# Patient Record
Sex: FEMALE | Race: AMERICAN INDIAN OR ALASKA NATIVE
[De-identification: names, ages, dates, MRNs, and addresses within clinical notes are randomized per-mention and may not be internally consistent; named-entity substitution may affect disease eponyms.]

---

## 2017-07-14 ENCOUNTER — HOSPITAL ENCOUNTER (EMERGENCY)
Dept: HOSPITAL 41 - JD.ED | Age: 27
Discharge: HOME | End: 2017-07-14
Payer: COMMERCIAL

## 2017-07-14 VITALS — DIASTOLIC BLOOD PRESSURE: 92 MMHG | SYSTOLIC BLOOD PRESSURE: 122 MMHG

## 2017-07-14 DIAGNOSIS — Z79.899: ICD-10-CM

## 2017-07-14 DIAGNOSIS — Z3A.01: ICD-10-CM

## 2017-07-14 DIAGNOSIS — O21.9: Primary | ICD-10-CM

## 2017-07-14 PROCEDURE — 80053 COMPREHEN METABOLIC PANEL: CPT

## 2017-07-14 PROCEDURE — 84702 CHORIONIC GONADOTROPIN TEST: CPT

## 2017-07-14 PROCEDURE — 36415 COLL VENOUS BLD VENIPUNCTURE: CPT

## 2017-07-14 PROCEDURE — 99285 EMERGENCY DEPT VISIT HI MDM: CPT

## 2017-07-14 PROCEDURE — 81001 URINALYSIS AUTO W/SCOPE: CPT

## 2017-07-14 PROCEDURE — 86140 C-REACTIVE PROTEIN: CPT

## 2017-07-14 PROCEDURE — 76817 TRANSVAGINAL US OBSTETRIC: CPT

## 2017-07-14 PROCEDURE — 85025 COMPLETE CBC W/AUTO DIFF WBC: CPT

## 2017-07-14 PROCEDURE — 96374 THER/PROPH/DIAG INJ IV PUSH: CPT

## 2017-07-14 PROCEDURE — 93005 ELECTROCARDIOGRAM TRACING: CPT

## 2017-07-14 PROCEDURE — 96375 TX/PRO/DX INJ NEW DRUG ADDON: CPT

## 2017-07-14 PROCEDURE — 96361 HYDRATE IV INFUSION ADD-ON: CPT

## 2017-07-14 NOTE — EDM.PDOC
<Chikis Shelton - Last Filed: 17 19:55>





ED HPI GENERAL MEDICAL PROBLEM





- General


Chief Complaint: Gastrointestinal Problem


Stated Complaint: NAUSEA AND VOMITING X 10 DAYS


Time Seen by Provider: 17 16:00


Source of Information: Reports: Patient


History Limitations: Reports: No Limitations





- History of Present Illness


INITIAL COMMENTS - FREE TEXT/NARRATIVE: 





Nati is a 27 year old female who presents today with complaints of nausea and 

vomiting X 10 days. Her symptoms have been gradually getting worse. She has not 

been able to keep anything down for the past 6 days.  When her symptoms began, 

she developed a cough with productive sputum. These symptoms have been 

improving gradually, but the nausea and vomiting is continuous. She has 

dizziness with position changes.  Associated symptoms include chills and 

shortness of breath. Denies fever, night sweats, diarrhea. She has not had any 

recent travel or close contacts who have been sick. She reports her last 

menstrual period was "maybe sometime in " but unsure of the exact date. 





- Related Data


 Allergies











Allergy/AdvReac Type Severity Reaction Status Date / Time


 


No Known Allergies Allergy   Verified 17 15:32











Home Meds: 


 Home Meds





Acetaminophen/HYDROcodone [Norco 325-5 MG] 1 tab PO Q8H 17 [History]


Cyclobenzaprine [Flexeril] 5 mg PO TID 17 [History]


Doxylamine/Pyridoxine HCl [Diclegis Dr 10-10 mg Tablet] 1 each PO ASDIRECTED #

30 tablet. 17 [Rx]


Ferrous Sulfate [Iron] 325 mg PO DAILY 17 [History]


Gabapentin [Neurontin] 600 mg PO TID 17 [History]











ED ROS GENERAL





- Review of Systems


Review Of Systems: See Below


Constitutional: Reports: Chills, Weakness.  Denies: Fever


HEENT: Reports: No Symptoms


Respiratory: Reports: Shortness of Breath


Cardiovascular: Denies: Lightheadedness


GI/Abdominal: Reports: Abdominal Pain, Mucous in Stool.  Denies: Bloody Stool, 

Diarrhea, Hematemesis, Hematochezia


: Denies: Dysuria, Flank Pain, Frequency, Urgency


Musculoskeletal: Denies: Neck Pain


Skin: Reports: No Symptoms


Neurological: Reports: Dizziness (dizziness with position changes )





ED EXAM, GI/ABD





- Physical Exam


Exam Limited By: No Limitations


General Appearance: Alert


Ears: Normal TMs


Throat/Mouth: Normal Oropharynx


Head: Normocephalic


Neck: Supple


Respiratory/Chest: Lungs Clear, Normal Breath Sounds


Cardiovascular: Regular Rate, Rhythm, No Edema


GI/Abdominal: Tenderness (tenderness upon palpation to mid-upper gastric area )


Neurological: Alert, Oriented, CN II-XII Intact


Psychiatric: Normal Affect


Skin Exam: Intact, Diaphoretic


Lymphatic: No Adenopathy





Course





- Vital Signs


Last Recorded V/S: 


 Last Vital Signs











Temp  36.6 C   17 15:30


 


Pulse  74   17 15:30


 


Resp  19   17 15:30


 


BP  122/92 H  17 15:30


 


Pulse Ox  99   17 15:30














- Orders/Labs/Meds


Orders: 


 Active Orders 24 hr











 Category Date Time Status


 


 EKG Documentation Completion [RC] STAT Care  17 16:13 Active


 


 Orthostatic Vital Signs [RC] ASDIRECTED Care  17 16:37 Active


 


 Peripheral IV Care [RC] .AS DIRECTED Care  17 16:14 Active


 


 OB Transvaginal [US] Stat Exams  17 17:50 Taken


 


 Peripheral IV Insertion Adult [OM.PC] Routine Oth  17 16:13 Ordered











Labs: 


 Laboratory Tests











  17 Range/Units





  15:54 15:54 15:54 


 


WBC  9.90    (3.98-10.04)  K/mm3


 


RBC  4.86    (3.98-5.22)  M/mm3


 


Hgb  13.1    (11.2-15.7)  gm/L


 


Hct  38.9    (34.1-44.9)  %


 


MCV  80.0    (79.4-94.8)  fl


 


MCH  27.0    (25.6-32.2)  pg


 


MCHC  33.7    (32.2-35.5)  g/dl


 


RDW Std Deviation  40.4    (36.4-46.3)  fL


 


Plt Count  365    (182-369)  K/mm3


 


MPV  9.9    (9.4-12.3)  fl


 


Neutrophils % (Manual)  92 H    (40-60)  %


 


Band Neutrophils %  0    (0-10)  %


 


Lymphocytes % (Manual)  5 L    (20-40)  %


 


Atypical Lymphs %  0    %


 


Monocytes % (Manual)  3    (2-10)  %


 


Eosinophils % (Manual)  0 L    (0.7-5.8)  %


 


Basophils % (Manual)  0 L    (0.1-1.2)  


 


Platelet Estimate  Adequate    


 


RBC Morph Comment  Normal    


 


Sodium   137   (136-145)  mEq/L


 


Potassium   3.8   (3.5-5.1)  mEq/L


 


Chloride   102   ()  mEq/L


 


Carbon Dioxide   22   (21-32)  mEq/L


 


Anion Gap   16.8 H   (5-15)  


 


BUN   8   (7-18)  mg/dL


 


Creatinine   0.8   (0.55-1.02)  mg/dL


 


Est Cr Clr Drug Dosing   95.89   mL/min


 


Estimated GFR (MDRD)   > 60   (>60)  mL/min


 


BUN/Creatinine Ratio   10.0 L   (14-18)  


 


Glucose   99   ()  mg/dL


 


Calcium   9.6   (8.5-10.1)  mg/dL


 


Total Bilirubin   0.6   (0.2-1.0)  mg/dL


 


AST   15   (15-37)  U/L


 


ALT   19   (14-59)  U/L


 


Alkaline Phosphatase   63   ()  U/L


 


C-Reactive Protein    4.3 H*  (<1.0)  mg/dL


 


Total Protein   9.2 H   (6.4-8.2)  g/dl


 


Albumin   3.7   (3.4-5.0)  g/dl


 


Globulin   5.5   gm/dL


 


Albumin/Globulin Ratio   0.7 L   (1-2)  


 


HCG, Quant    520665.0  mIU/mL


 


Urine Color     (Yellow)  


 


Urine Appearance     (Clear)  


 


Urine pH     (5.0-8.0)  


 


Ur Specific Gravity     (1.005-1.030)  


 


Urine Protein     (Negative)  


 


Urine Glucose (UA)     (Negative)  


 


Urine Ketones     (Negative)  


 


Urine Occult Blood     (Negative)  


 


Urine Nitrite     (Negative)  


 


Urine Bilirubin     (Negative)  


 


Urine Urobilinogen     (0.2-1.0)  


 


Ur Leukocyte Esterase     (Negative)  


 


Urine RBC     (0-5)  /hpf


 


Urine WBC     (0-5)  /hpf


 


Ur Epithelial Cells     (0-5)  /hpf


 


Urine Bacteria     (FEW)  /hpf


 


Urine Mucus     (FEW)  /hpf














  / Range/Units





  17:32 


 


WBC   (3.98-10.04)  K/mm3


 


RBC   (3.98-5.22)  M/mm3


 


Hgb   (11.2-15.7)  gm/L


 


Hct   (34.1-44.9)  %


 


MCV   (79.4-94.8)  fl


 


MCH   (25.6-32.2)  pg


 


MCHC   (32.2-35.5)  g/dl


 


RDW Std Deviation   (36.4-46.3)  fL


 


Plt Count   (182-369)  K/mm3


 


MPV   (9.4-12.3)  fl


 


Neutrophils % (Manual)   (40-60)  %


 


Band Neutrophils %   (0-10)  %


 


Lymphocytes % (Manual)   (20-40)  %


 


Atypical Lymphs %   %


 


Monocytes % (Manual)   (2-10)  %


 


Eosinophils % (Manual)   (0.7-5.8)  %


 


Basophils % (Manual)   (0.1-1.2)  


 


Platelet Estimate   


 


RBC Morph Comment   


 


Sodium   (136-145)  mEq/L


 


Potassium   (3.5-5.1)  mEq/L


 


Chloride   ()  mEq/L


 


Carbon Dioxide   (21-32)  mEq/L


 


Anion Gap   (5-15)  


 


BUN   (7-18)  mg/dL


 


Creatinine   (0.55-1.02)  mg/dL


 


Est Cr Clr Drug Dosing   mL/min


 


Estimated GFR (MDRD)   (>60)  mL/min


 


BUN/Creatinine Ratio   (14-18)  


 


Glucose   ()  mg/dL


 


Calcium   (8.5-10.1)  mg/dL


 


Total Bilirubin   (0.2-1.0)  mg/dL


 


AST   (15-37)  U/L


 


ALT   (14-59)  U/L


 


Alkaline Phosphatase   ()  U/L


 


C-Reactive Protein   (<1.0)  mg/dL


 


Total Protein   (6.4-8.2)  g/dl


 


Albumin   (3.4-5.0)  g/dl


 


Globulin   gm/dL


 


Albumin/Globulin Ratio   (1-2)  


 


HCG, Quant   mIU/mL


 


Urine Color  Yellow  (Yellow)  


 


Urine Appearance  Slt cloudy H  (Clear)  


 


Urine pH  7.0  (5.0-8.0)  


 


Ur Specific Gravity  1.020  (1.005-1.030)  


 


Urine Protein  1+ H  (Negative)  


 


Urine Glucose (UA)  Negative  (Negative)  


 


Urine Ketones  3+ H  (Negative)  


 


Urine Occult Blood  Negative  (Negative)  


 


Urine Nitrite  Negative  (Negative)  


 


Urine Bilirubin  1+ H  (Negative)  


 


Urine Urobilinogen  2.0 H  (0.2-1.0)  


 


Ur Leukocyte Esterase  Negative  (Negative)  


 


Urine RBC  0-5  (0-5)  /hpf


 


Urine WBC  0-5  (0-5)  /hpf


 


Ur Epithelial Cells  5-10 H  (0-5)  /hpf


 


Urine Bacteria  Moderate H  (FEW)  /hpf


 


Urine Mucus  Moderate H  (FEW)  /hpf











Meds: 


Medications














Discontinued Medications














Generic Name Dose Route Start Last Admin





  Trade Name Freq  PRN Reason Stop Dose Admin


 


Diphenhydramine HCl  25 mg  17 20:47  17 20:52





  Benadryl  IVPUSH  17 20:48  25 mg





  ONETIME ONE   Administration


 


Sodium Chloride  1,000 mls @ 999 mls/hr  17 16:14  17 16:23





  Normal Saline  IV  17 17:14  999 mls/hr





  ONETIME ONE   Administration


 


Metoclopramide HCl  5 mg  17 20:47  17 20:52





  Reglan  IVPUSH  17 20:48  5 mg





  ONETIME ONE   Administration


 


Ondansetron HCl  4 mg  17 16:14  17 16:24





  Zofran  IVPUSH  17 16:15  4 mg





  ONETIME ONE   Administration


 


Sodium Chloride  10 ml  17 16:13  17 16:24





  Saline Flush  FLUSH   10 ml





  ASDIRECTED PRN   Administration





  Keep Vein Open   














Departure





- Departure


Disposition: Home, Self-Care 01


Clinical Impression: 


 Nausea and vomiting during pregnancy, Pregnancy








- Discharge Information


Prescriptions: 


Doxylamine/Pyridoxine HCl [Diclegis Dr 10-10 mg Tablet] 1 each PO ASDIRECTED #

30 tablet.


Instructions:  Prenatal Care, First Trimester of Pregnancy


Referrals: 


Melissa Amaya NP [Primary Care Provider] - 


Victor Manuel Jackson MD [Physician] - 


Forms:  ED Department Discharge


Additional Instructions: 


Drink 1/2 your body weight in ounces of fluids a day. 





Take OTC medications from provided list as needed. No aspirin, ibuprofen, advil

, NSAIDs, etc during pregnancy.





Take the diclegis as prescribed. 2 tabs PO at hour of sleep. If ausea persist 

after 2 days may increase to 1 tab PO qam and 2 tabs PO qhs





Follow-up with ob in the next 1-2 weeks. Recommend Dr. Barnett or Dr. Jackson. Call 

[phone] to schedule with one of them. 





Please return to the ER should your symptoms change or worsen. 





- My Orders


Last 24 Hours: 


My Active Orders





17 16:13


EKG Documentation Completion [RC] STAT 


Peripheral IV Insertion Adult [OM.PC] Routine 





17 16:14


Peripheral IV Care [RC] .AS DIRECTED 





17 16:37


Orthostatic Vital Signs [RC] ASDIRECTED 





17 17:50


OB Transvaginal [US] Stat 














- Assessment/Plan


Last 24 Hours: 


My Active Orders





17 16:13


EKG Documentation Completion [RC] STAT 


Peripheral IV Insertion Adult [OM.PC] Routine 





17 16:14


Peripheral IV Care [RC] .AS DIRECTED 





17 16:37


Orthostatic Vital Signs [RC] ASDIRECTED 





17 17:50


OB Transvaginal [US] Stat 














<Karoline Chávez - Last Filed: 07/15/17 10:06>





ED HPI GENERAL MEDICAL PROBLEM





- General


History Limitations: Reports: No Limitations





- History of Present Illness


INITIAL COMMENTS - FREE TEXT/NARRATIVE: 





I agree with the HPI as documented by [employer] student. 





I have interviewed the patient. 27 year old female reports severe nausea and 

vomiting for 10 days. Current symptoms include chills, dizziness, nausea, 

decreased energy, chest pain and shortness of breath. Patient reports 7 days 

ago she became ill with cold symptoms. Has tried OTC cold medications. Reports 

a nonproductive cough. Attributes the chest pain and shortness of breath to the 

could symptoms and heartburn.





Patient reports LMP was the beginning of . States there is a chance of 

pregnancy. She is a . 





Patient has a history of back problems and RA. She currently sees pain 

management and ortho for back pain. 





Past Medical History


OB/GYN History: Reports: Pregnancy


Musculoskeletal History: Reports: Back Pain, Chronic, RA


Neurological History: Reports: Migraines


Hematologic History: Reports: Iron Deficiency





- Past Surgical History


Musculoskeletal Surgical History: Reports: None





Social & Family History





- Tobacco Use


Smoking Status *Q: Former Smoker


Used Tobacco, but Quit: Yes


Month Tobacco Last Used: 5 years ago


Second Hand Smoke Exposure: No





- Caffeine Use


Caffeine Use: Reports: Coffee, Tea





- Recreational Drug Use


Recreational Drug Use: No





ED ROS GENERAL





- Review of Systems


Review Of Systems: See Below


Constitutional: Reports: Chills, Fatigue


Respiratory: Reports: Shortness of Breath, Cough


Cardiovascular: Reports: Chest Pain


GI/Abdominal: Reports: Nausea, Vomiting


: Reports: No Symptoms


Neurological: Reports: Dizziness.  Denies: Syncope





ED EXAM, GI/ABD





- Physical Exam


Exam: See Below


Exam Limited By: No Limitations


General Appearance: Alert, WD/WN, No Apparent Distress


Throat/Mouth: Normal Inspection, Normal Lips, Normal Voice, No Airway Compromise


Respiratory/Chest: No Respiratory Distress, Lungs Clear, Normal Breath Sounds


Cardiovascular: Normal Peripheral Pulses, Regular Rate, Rhythm, No Murmur


GI/Abdominal: Normal Bowel Sounds, Soft


Neurological: Alert, Oriented, Normal Cognition


Psychiatric: Normal Affect, Normal Mood


Skin Exam: Warm, Dry, Normal Color





EKG INTERPRETATION


EKG Date: 17


Time: 16:20


Rhythm: Other (sinus arrhythmia)


Rate (Beats/Min): 75


Axis: Normal


P-Wave: Present


QRS: Normal


ST-T: Normal


QT: Normal


EKG Interpretation Comments: 





sinus arrhythmia at 75 bpm. 





Reviewed by myself and Dr. Muir. 





Course





- Orders/Labs/Meds


Labs: 


 Laboratory Tests











  17 Range/Units





  15:54 15:54 15:54 


 


WBC  9.90    (3.98-10.04)  K/mm3


 


RBC  4.86    (3.98-5.22)  M/mm3


 


Hgb  13.1    (11.2-15.7)  gm/L


 


Hct  38.9    (34.1-44.9)  %


 


MCV  80.0    (79.4-94.8)  fl


 


MCH  27.0    (25.6-32.2)  pg


 


MCHC  33.7    (32.2-35.5)  g/dl


 


RDW Std Deviation  40.4    (36.4-46.3)  fL


 


Plt Count  365    (182-369)  K/mm3


 


MPV  9.9    (9.4-12.3)  fl


 


Neutrophils % (Manual)  92 H    (40-60)  %


 


Band Neutrophils %  0    (0-10)  %


 


Lymphocytes % (Manual)  5 L    (20-40)  %


 


Atypical Lymphs %  0    %


 


Monocytes % (Manual)  3    (2-10)  %


 


Eosinophils % (Manual)  0 L    (0.7-5.8)  %


 


Basophils % (Manual)  0 L    (0.1-1.2)  


 


Platelet Estimate  Adequate    


 


RBC Morph Comment  Normal    


 


Sodium   137   (136-145)  mEq/L


 


Potassium   3.8   (3.5-5.1)  mEq/L


 


Chloride   102   ()  mEq/L


 


Carbon Dioxide   22   (21-32)  mEq/L


 


Anion Gap   16.8 H   (5-15)  


 


BUN   8   (7-18)  mg/dL


 


Creatinine   0.8   (0.55-1.02)  mg/dL


 


Est Cr Clr Drug Dosing   95.89   mL/min


 


Estimated GFR (MDRD)   > 60   (>60)  mL/min


 


BUN/Creatinine Ratio   10.0 L   (14-18)  


 


Glucose   99   ()  mg/dL


 


Calcium   9.6   (8.5-10.1)  mg/dL


 


Total Bilirubin   0.6   (0.2-1.0)  mg/dL


 


AST   15   (15-37)  U/L


 


ALT   19   (14-59)  U/L


 


Alkaline Phosphatase   63   ()  U/L


 


C-Reactive Protein    4.3 H*  (<1.0)  mg/dL


 


Total Protein   9.2 H   (6.4-8.2)  g/dl


 


Albumin   3.7   (3.4-5.0)  g/dl


 


Globulin   5.5   gm/dL


 


Albumin/Globulin Ratio   0.7 L   (1-2)  


 


HCG, Quant    780876.0  mIU/mL


 


Urine Color     (Yellow)  


 


Urine Appearance     (Clear)  


 


Urine pH     (5.0-8.0)  


 


Ur Specific Gravity     (1.005-1.030)  


 


Urine Protein     (Negative)  


 


Urine Glucose (UA)     (Negative)  


 


Urine Ketones     (Negative)  


 


Urine Occult Blood     (Negative)  


 


Urine Nitrite     (Negative)  


 


Urine Bilirubin     (Negative)  


 


Urine Urobilinogen     (0.2-1.0)  


 


Ur Leukocyte Esterase     (Negative)  


 


Urine RBC     (0-5)  /hpf


 


Urine WBC     (0-5)  /hpf


 


Ur Epithelial Cells     (0-5)  /hpf


 


Urine Bacteria     (FEW)  /hpf


 


Urine Mucus     (FEW)  /hpf














  / Range/Units





  17:32 


 


WBC   (3.98-10.04)  K/mm3


 


RBC   (3.98-5.22)  M/mm3


 


Hgb   (11.2-15.7)  gm/L


 


Hct   (34.1-44.9)  %


 


MCV   (79.4-94.8)  fl


 


MCH   (25.6-32.2)  pg


 


MCHC   (32.2-35.5)  g/dl


 


RDW Std Deviation   (36.4-46.3)  fL


 


Plt Count   (182-369)  K/mm3


 


MPV   (9.4-12.3)  fl


 


Neutrophils % (Manual)   (40-60)  %


 


Band Neutrophils %   (0-10)  %


 


Lymphocytes % (Manual)   (20-40)  %


 


Atypical Lymphs %   %


 


Monocytes % (Manual)   (2-10)  %


 


Eosinophils % (Manual)   (0.7-5.8)  %


 


Basophils % (Manual)   (0.1-1.2)  


 


Platelet Estimate   


 


RBC Morph Comment   


 


Sodium   (136-145)  mEq/L


 


Potassium   (3.5-5.1)  mEq/L


 


Chloride   ()  mEq/L


 


Carbon Dioxide   (21-32)  mEq/L


 


Anion Gap   (5-15)  


 


BUN   (7-18)  mg/dL


 


Creatinine   (0.55-1.02)  mg/dL


 


Est Cr Clr Drug Dosing   mL/min


 


Estimated GFR (MDRD)   (>60)  mL/min


 


BUN/Creatinine Ratio   (14-18)  


 


Glucose   ()  mg/dL


 


Calcium   (8.5-10.1)  mg/dL


 


Total Bilirubin   (0.2-1.0)  mg/dL


 


AST   (15-37)  U/L


 


ALT   (14-59)  U/L


 


Alkaline Phosphatase   ()  U/L


 


C-Reactive Protein   (<1.0)  mg/dL


 


Total Protein   (6.4-8.2)  g/dl


 


Albumin   (3.4-5.0)  g/dl


 


Globulin   gm/dL


 


Albumin/Globulin Ratio   (1-2)  


 


HCG, Quant   mIU/mL


 


Urine Color  Yellow  (Yellow)  


 


Urine Appearance  Slt cloudy H  (Clear)  


 


Urine pH  7.0  (5.0-8.0)  


 


Ur Specific Gravity  1.020  (1.005-1.030)  


 


Urine Protein  1+ H  (Negative)  


 


Urine Glucose (UA)  Negative  (Negative)  


 


Urine Ketones  3+ H  (Negative)  


 


Urine Occult Blood  Negative  (Negative)  


 


Urine Nitrite  Negative  (Negative)  


 


Urine Bilirubin  1+ H  (Negative)  


 


Urine Urobilinogen  2.0 H  (0.2-1.0)  


 


Ur Leukocyte Esterase  Negative  (Negative)  


 


Urine RBC  0-5  (0-5)  /hpf


 


Urine WBC  0-5  (0-5)  /hpf


 


Ur Epithelial Cells  5-10 H  (0-5)  /hpf


 


Urine Bacteria  Moderate H  (FEW)  /hpf


 


Urine Mucus  Moderate H  (FEW)  /hpf














- Radiology Interpretation


Free Text/Narrative:: 





transvaginal ultrasound impression per Dr. Van: 





 6 week, 6 day single live intrauterine pregnancy, EDC 2018


No subchorionic hemorrhage


no molar pregnancy


enlarged ovaries bilaterally


1.5 x 1.2 x1 cm right ovarian corpus luteum cyst.


7.8 x 7 x 3.6 cm left ovarian complex cyst with a 1.5 x 0.7 cm soft tissue 

mural nodule. Recommend follow-up ultrasound


thich walled 4.5 x 5 x 8 cm cystic structure in the posteriro cul-de-sac. 

Recommend follow-up utrasound and if necessary pelvic MRI correlation. 





- Re-Assessments/Exams


Free Text/Narrative Re-Assessment/Exam: 





17 16:10


Plan will be to give zofran for nausea, fluids and labs. Will check pregnancy 

test prior to chest xray.





17 17:50


Labs returned.


wbc is 9.90, hgb is 13.1 and plts are 365


sodium is 137, potassium is 3.8 and chloride is 102. anion gap is 16.8


hcg is [id number]





Will defer chest xray at this time. The patient is pregnant. Lungs are clear. 

white count is normal. 





Will obtain and ultrasound to establish dates and rule out a molar pregnancy 

due to the severe n/v and high hcg





17 20:54


I reviewed the ultrasound report with the patient.





Nausea returning. Will give reglan and benadryl prior to discharge.





Discharge instructions as documented. 





Departure





- Departure


Time of Disposition: 20:55


Condition: Good

## 2017-07-16 NOTE — US
First trimester obstetrical ultrasound: Multiple real-time images were

 obtained transabdominally and transvaginally.

 

Comparison: No previous obstetrical ultrasound.

 

Dates:

LMP: ?

Current ultrasound: [facility]IO 03/03/18, gestational age 6 weeks 6 days

 

Single intrauterine gestation is seen.  Amniotic fluid volume is 

within normal limits.  Yolk sac and embryo are identified.  No 

subchorionic hemorrhage is seen.  No free fluid is seen.

 

7.8 cm cyst is noted within the left ovary.  This shows small soft 

tissue nodule along the wall.  Right ovary appears within normal 

limits.  Additional thick walled cyst is seen within the cul-de-sac 

measuring 5.8 cm.

 

Measurements:

Gestational sac: 2.62 cm - 7 weeks 4 days

Crown-rump length: 0.87 cm - 6 weeks 4 days

Heart rate: 169 BPM

 

Impression:

1.  7.8 cm slightly complicated cyst within the maternal left ovary.  

Additional thick walled cyst within the cul-de-sac measuring 5.8 cm.  

Both these cysts are nonspecific regarding etiology.

2.  Single intrauterine gestation.  Dates as noted above.

3.  No additional abnormality is seen.  

 

Diagnostic code #9

 

I agree with preliminary report issued by [facility] ([facility] report finalized 

on 7/14/17, 8:54 PM Central Time)

## 2018-02-28 ENCOUNTER — HOSPITAL ENCOUNTER (INPATIENT)
Dept: HOSPITAL 41 - JD.OBCHECK | Age: 28
LOS: 2 days | Discharge: HOME | End: 2018-03-02
Attending: OBSTETRICS & GYNECOLOGY | Admitting: OBSTETRICS & GYNECOLOGY
Payer: COMMERCIAL

## 2018-02-28 DIAGNOSIS — Z87.891: ICD-10-CM

## 2018-02-28 DIAGNOSIS — E61.1: ICD-10-CM

## 2018-02-28 DIAGNOSIS — M06.9: ICD-10-CM

## 2018-02-28 DIAGNOSIS — Z3A.39: ICD-10-CM

## 2018-02-28 DIAGNOSIS — O42.92: Primary | ICD-10-CM

## 2018-02-28 DIAGNOSIS — Z23: ICD-10-CM

## 2018-02-28 DIAGNOSIS — Z79.899: ICD-10-CM

## 2018-02-28 PROCEDURE — 00HU33Z INSERTION OF INFUSION DEVICE INTO SPINAL CANAL, PERCUTANEOUS APPROACH: ICD-10-PCS

## 2018-02-28 PROCEDURE — 3E0R3BZ INTRODUCTION OF ANESTHETIC AGENT INTO SPINAL CANAL, PERCUTANEOUS APPROACH: ICD-10-PCS

## 2018-02-28 NOTE — PCM.PREANE
Preanesthetic Assessment





- Anesthesia/Transfusion/Family Hx


Anesthesia History: Prior Anesthesia Without Reaction


Family History of Anesthesia Reaction: No


Transfusion History: No Prior Transfusion(s)


Intubation History: Unknown





- Review of Systems


Pulmonary: No Symptoms (quit smoking 5 years ago)


Cardiovascular: Lightheadedness (postional changes)


Gastrointestinal: No Symptoms (GERD), Constipation


Neurological: No Symptoms (Chronic back pain/History of rheumatoid arthritis), 

Headache (Migraines)


Other: Reports: None





- Physical Assessment


NPO Status Date: 02/28/18


NPO Status Time: 09:30


Pulse: 80


O2 Sat by Pulse Oximetry: 100


Respiratory Rate: 14


Blood Pressure: 119/77


Temperature: 36.6 C


Vital Signs: 





 Last Vital Signs











Temp  36.6 C   02/28/18 12:49


 


Pulse  80   02/28/18 13:50


 


Resp  14   02/28/18 12:49


 


BP  119/77   02/28/18 13:50


 


Pulse Ox  100   02/28/18 12:49











Height: 1.7 m


Weight: 93.894 kg


ASA Class: 2


Mental Status: Alert & Oriented x3


Airway Class: Mallampati = 3


Dentition: Reports: Normal Dentition, Caries


Thyro-Mental Finger Breadths: 3


Mouth Opening Finger Breadths: 3


ROM/Head Extension: Full


Lungs: Clear to Auscultation, Normal Respiratory Effort


Cardiovascular: Regular Rate, Regular Rhythm, No Murmurs





- Lab


Values: 





 Laboratory Last Values











WBC  10.59 K/mm3 (3.98-10.04)  H  02/28/18  13:16    


 


RBC  3.98 M/mm3 (3.98-5.22)   02/28/18  13:16    


 


Hgb  8.8 gm/L (11.2-15.7)  L  02/28/18  13:16    


 


Hct  28.2 % (34.1-44.9)  L  02/28/18  13:16    


 


MCV  70.9 fl (79.4-94.8)  L  02/28/18  13:16    


 


MCH  22.1 pg (25.6-32.2)  L  02/28/18  13:16    


 


MCHC  31.2 g/dl (32.2-35.5)  L  02/28/18  13:16    


 


RDW Std Deviation  44.5 fL (36.4-46.3)   02/28/18  13:16    


 


Plt Count  324 K/mm3 (182-369)   02/28/18  13:16    


 


MPV  9.8 fl (9.4-12.3)   02/28/18  13:16    


 


Neut % (Auto)  81.6 % (34.0-71.1)  H  02/28/18  13:16    


 


Lymph % (Auto)  10.7 % (19.3-51.7)  L  02/28/18  13:16    


 


Mono % (Auto)  6.8 % (4.7-12.5)   02/28/18  13:16    


 


Eos % (Auto)  0.4  (0.7-5.8)  L  02/28/18  13:16    


 


Baso % (Auto)  0.2 % (0.1-1.2)   02/28/18  13:16    


 


Neut # (Auto)  8.65 K/mm3 (1.56-6.13)  H  02/28/18  13:16    


 


Lymph # (Auto)  1.13 K/mm3 (1.18-3.74)  L  02/28/18  13:16    


 


Mono # (Auto)  0.72 K/mm3 (0.24-0.36)  H  02/28/18  13:16    


 


Eos # (Auto)  0.04 K/mm3 (0.04-0.36)   02/28/18  13:16    


 


Baso # (Auto)  0.02 K/mm3 (0.01-0.08)   02/28/18  13:16    


 


Manual Slide Review  Abnormal smear   02/28/18  13:16    








Above labs reviewed and noted and within acceptable ranges to proceed with 

epidural.





- Allergies


Allergies/Adverse Reactions: 


 Allergies











Allergy/AdvReac Type Severity Reaction Status Date / Time


 


No Known Allergies Allergy   Verified 07/14/17 15:32














- Anesthesia Plan


Pre-Op Medication Ordered: None





- Acknowledgements


Anesthesia Type Planned: Epidural


Pt an Appropriate Candidate for the Planned Anesthesia: Yes


Alternatives and Risks of Anesthesia Discussed w Pt/Guardian: Yes


Pt/Guardian Understands and Agrees with Anesthesia Plan: Yes





PreAnesthesia Questionnaire


HEENT History: Reports: Other (See Below)


Other HEENT History: wears glasses


OB/GYN History: Reports: Pregnancy


Musculoskeletal History: Reports: Back Pain, Chronic, RA


Neurological History: Reports: Migraines


Hematologic History: Reports: Iron Deficiency





- Past Surgical History


Musculoskeletal Surgical History: Reports: None





- SUBSTANCE USE


Smoking Status *Q: Former Smoker


Tobacco Use Within Last Twelve Months: No


Second Hand Smoke Exposure: No


Recreational Drug Use History: No





- HOME MEDS


Home Medications: 


 Home Meds





Acetaminophen/HYDROcodone [Norco 325-5 MG] 1 tab PO Q8H 07/14/17 [History]


Cyclobenzaprine [Flexeril] 5 mg PO TID 07/14/17 [History]


Doxylamine/Pyridoxine HCl [Diclegis Dr 10-10 mg Tablet] 1 each PO ASDIRECTED #

30 tablet. 07/14/17 [Rx]


Ferrous Sulfate [Iron] 325 mg PO DAILY 07/14/17 [History]


Gabapentin [Neurontin] 600 mg PO TID 07/14/17 [History]











- CURRENT (IN HOUSE) MEDS


Current Meds: 





 Current Medications





Ephedrine Sulfate (Ephedrine Sulfate)  5 mg IVPUSH ASDIRECTED PRN


   PRN Reason: Hypotension


Fentanyl (Sublimaze)  100 mcg EPIDUR Q3H PRN


   PRN Reason: Pain


Fentanyl/Bupivacaine HCl (Fentanyl/Bupivacaine/Ns 2 Mcg-0.125% 100 Ml)  100 ml 

EPIDUR ASDIRECTED LILY


Ampicillin Sodium 1 gm/ Sodium (Chloride)  100 mls @ 200 mls/hr IV Q4H LILY


Lactated Ringer's (Ringers, Lactated)  1,000 mls @ 100 mls/hr IV ASDIRECTED LILY


Oxytocin/Lactated Ringer's (Pitocin In Lr 10 Units/1,000 Ml)  10 unit in 1,000 

mls @ 12 mls/hr IV TITRATE LILY; 2 MUNITS/MIN


   PRN Reason: Protocol


Oxytocin/Lactated Ringer's (Pitocin In Lr 10 Units/1,000 Ml)  10 unit in 1,000 

mls @ 500 mls/hr IV .CONTINUOUS LILY


Nalbuphine HCl (Nubain)  10 mg IVPUSH Q2H PRN


   PRN Reason: Pain (moderate 4-6)


Ondansetron HCl (Zofran)  4 mg IVPUSH ONETIME PRN


   PRN Reason: Nausea/Vomiting


Sodium Chloride (Saline Flush)  10 ml FLUSH ASDIRECTED PRN


   PRN Reason: Keep Vein Open





Discontinued Medications





Ampicillin Sodium 2 gm/ Sodium (Chloride)  100 mls @ 200 mls/hr IV ONETIME ONE


   Stop: 02/28/18 13:18

## 2018-02-28 NOTE — PCM.LDHP
L&D History of Present Illness





- General


Date of Service: 18


Admit Problem/Dx: 


 Patient Status Order with Admit Dx/Problem





18 12:30


Patient Status [ADT] Routine 








 Admission Diagnosis/Problem











Admission Diagnosis/Problem    Pregnancy

















- History of Present Illness


Introduction:: 





28 year old  here with SROM. Minimal contractions.


Improves with: Reports: None


Worsens with: Reports: None


Associated Symptoms: Reports: N





- Related Data


Allergies/Adverse Reactions: 


 Allergies











Allergy/AdvReac Type Severity Reaction Status Date / Time


 


No Known Allergies Allergy   Verified 17 15:32











Home Medications: 


 Home Meds





Acetaminophen/HYDROcodone [Norco 325-5 MG] 1 tab PO Q8H 17 [History]


Cyclobenzaprine [Flexeril] 5 mg PO TID 17 [History]


Doxylamine/Pyridoxine HCl [Diclegis Dr 10-10 mg Tablet] 1 each PO ASDIRECTED #

30 tablet. 17 [Rx]


Ferrous Sulfate [Iron] 325 mg PO DAILY 17 [History]


Gabapentin [Neurontin] 600 mg PO TID 17 [History]











Past Medical History


OB/GYN History: Reports: Pregnancy


Musculoskeletal History: Reports: Back Pain, Chronic, RA


Neurological History: Reports: Migraines


Hematologic History: Reports: Iron Deficiency





- Past Surgical History


Musculoskeletal Surgical History: Reports: None





Social & Family History





- Tobacco Use


Smoking Status *Q: Former Smoker


Used Tobacco, but Quit: Yes


Month Tobacco Last Used: 5 years ago


Second Hand Smoke Exposure: No





- Caffeine Use


Caffeine Use: Reports: Coffee, Tea





- Recreational Drug Use


Recreational Drug Use: No





H&P Review of Systems





- Review of Systems:


Review Of Systems: See Below


General: Reports: No Symptoms


HEENT: Reports: No Symptoms


Pulmonary: Reports: No Symptoms


Cardiovascular: Reports: No Symptoms


Gastrointestinal: Reports: No Symptoms


Genitourinary: Reports: No Symptoms


Musculoskeletal: Reports: No Symptoms


Skin: Reports: No Symptoms


Psychiatric: Reports: No Symptoms


Neurological: Reports: No Symptoms


Hematologic/Lymphatic: Reports: No Symptoms


Immunologic: Reports: No Symptoms





L&D Exam





- Exam


Exam: See Below





- Vital Signs


Weight: 93.894 kg





- OB Specific


Contraction Intensity: Moderate to Strong


Fetal Movement: Active


Fetal Heart Tones: Present


Fetal Heart Rate (FHR) Variability: Moderate (6-25 bmp)


Birth Presentation: Vertex





- Crooks Score


Crooks Score Cervix Position: Midposition


Crooks Score Consistency: Soft


Crooks Score Effacement: 31-50%


Crooks Score Dilation: 1-2 cm


Crooks Score Infant's Station: -2


Crooks Score Total: 6





- Exam


General: Alert, Oriented


HEENT: PERRLA, Conjunctiva Clear, EACs Clear, EOMI, Hearing Intact, Mucosa 

Moist & Pink, Nares Patent, Normal Nasal Septum, Posterior Pharynx Clear, TMs 

Clear


Neck: Supple, Trachea Midline


Lungs: Clear to Auscultation, Normal Respiratory Effort


Cardiovascular: Regular Rate, Regular Rhythm


GI/Abdominal Exam: Normal Bowel Sounds, Soft, Non-Tender, No Organomegaly, No 

Distention, No Abnormal Bruit, No Mass, Pelvis Stable


Genitourinary: Normal external exam, Normal bimanual exam, Normal speculum exam


Back Exam: Normal Inspection, Full Range of Motion


Extremities: Normal Inspection, Normal Range of Motion, Non-Tender, No Pedal 

Edema, Normal Capillary Refill


Skin: Warm, Dry, Intact


Neurological: Cranial Nerves Intact, Reflexes Equal Bilateral


Psychiatric: Alert, Normal Affect, Normal Mood





- Patient Data


Lab Results Last 24 hrs: 


 Laboratory Results - last 24 hr











  18 Range/Units





  13:16 


 


WBC  10.59 H  (3.98-10.04)  K/mm3


 


RBC  3.98  (3.98-5.22)  M/mm3


 


Hgb  8.8 L  (11.2-15.7)  gm/L


 


Hct  28.2 L  (34.1-44.9)  %


 


MCV  70.9 L  (79.4-94.8)  fl


 


MCH  22.1 L  (25.6-32.2)  pg


 


MCHC  31.2 L  (32.2-35.5)  g/dl


 


RDW Std Deviation  44.5  (36.4-46.3)  fL


 


Plt Count  324  (182-369)  K/mm3


 


MPV  9.8  (9.4-12.3)  fl


 


Neut % (Auto)  81.6 H  (34.0-71.1)  %


 


Lymph % (Auto)  10.7 L  (19.3-51.7)  %


 


Mono % (Auto)  6.8  (4.7-12.5)  %


 


Eos % (Auto)  0.4 L  (0.7-5.8)  


 


Baso % (Auto)  0.2  (0.1-1.2)  %


 


Neut # (Auto)  8.65 H  (1.56-6.13)  K/mm3


 


Lymph # (Auto)  1.13 L  (1.18-3.74)  K/mm3


 


Mono # (Auto)  0.72 H  (0.24-0.36)  K/mm3


 


Eos # (Auto)  0.04  (0.04-0.36)  K/mm3


 


Baso # (Auto)  0.02  (0.01-0.08)  K/mm3











Result Diagrams: 


 18 13:16





Problem List Initiated/Reviewed/Updated: Yes


Orders Last 24hrs: 


 Active Orders 24 hr











 Category Date Time Status


 


 Patient Status [ADT] Routine ADT  18 12:30 Active


 


 Activity as Tolerated [RC] PFP Care  18 12:49 Active


 


 Communication Order [RC] ASDIRECTED Care  18 12:49 Active


 


 Fetal Heart Tones [RC] ASDIRECTED Care  18 12:50 Active


 


 Notify Provider [RC] PFP Care  18 12:49 Active


 


 Notify Provider [RC] PRN Care  18 12:49 Active


 


 Peripheral IV Care [RC] .AS DIRECTED Care  18 12:50 Active


 


 Pump Management, Intrathecal [RC] ASDIRECTED Care  18 12:52 Active


 


 Vital Signs [RC] PER UNIT ROUTINE Care  18 12:49 Active


 


 Regular Diet [DIET] Diet  18 Lunch Active


 


 CBC WITH AUTO DIFF [HEME] Stat Lab  18 13:16 Results


 


 Ampicillin 1 gm Med  18 17:00 Active





 Sodium Chloride 0.9% [Normal Saline] 100 ml   





 IV Q4H   


 


 Lactated Ringers [Ringers, Lactated] 1,000 ml Med  18 13:00 Active





 IV ASDIRECTED   


 


 Nalbuphine [Nubain] Med  18 12:49 Active





 10 mg IVPUSH Q2H PRN   


 


 Oxytocin/Lactated Ringers [Pitocin in LR 10 Units/1,000 Med  18 13:00 

Active





 ML]   





 10 unit in 1,000 ml IV .CONTINUOUS   


 


 Oxytocin/Lactated Ringers [Pitocin in LR 10 Units/1,000 Med  18 13:00 

Active





 ML]   





 10 unit in 1,000 ml IV TITRATE   


 


 Sodium Chloride 0.9% [Saline Flush] Med  18 12:49 Active





 10 ml FLUSH ASDIRECTED PRN   


 


 Electronic Fetal Heart Tones Ext w TOCO [WOMSER] Oth  18 12:49 Ordered





 Routine   


 


 Electronic Fetal Heart Tones Internal [WOMSER] Per Unit Oth  18 12:49 

Ordered





 Routine   


 


 Peripheral IV Insertion Adult [OM.PC] Routine Oth  18 12:49 Ordered


 


 Resuscitation Status Routine Resus Stat  18 12:49 Ordered








 Medication Orders





Ampicillin Sodium 1 gm/ Sodium (Chloride)  100 mls @ 200 mls/hr IV Q4H LILY


Lactated Ringer's (Ringers, Lactated)  1,000 mls @ 100 mls/hr IV ASDIRECTED LILY


Oxytocin/Lactated Ringer's (Pitocin In Lr 10 Units/1,000 Ml)  10 unit in 1,000 

mls @ 12 mls/hr IV TITRATE LILY; 2 MUNITS/MIN


   PRN Reason: Protocol


Oxytocin/Lactated Ringer's (Pitocin In Lr 10 Units/1,000 Ml)  10 unit in 1,000 

mls @ 500 mls/hr IV .CONTINUOUS LILY


Nalbuphine HCl (Nubain)  10 mg IVPUSH Q2H PRN


   PRN Reason: Pain (moderate 4-6)


Sodium Chloride (Saline Flush)  10 ml FLUSH ASDIRECTED PRN


   PRN Reason: Keep Vein Open








Assessment/Plan Comment:: 





Term SROM


Augment as needed with pitocin.


Anticipate .

## 2018-03-01 RX ADMIN — HYDROCODONE BITARTRATE AND ACETAMINOPHEN PRN TAB: 5; 325 TABLET ORAL at 16:15

## 2018-03-01 NOTE — PCM.PNPP
- General Info


Date of Service: 18


Functional Status: Reports: Pain Controlled





- Review of Systems


General: Reports: No Symptoms


HEENT: Reports: No Symptoms


Pulmonary: Reports: No Symptoms


Cardiovascular: Reports: No Symptoms


Gastrointestinal: Reports: No Symptoms


Genitourinary: Reports: No Symptoms


Musculoskeletal: Reports: No Symptoms


Skin: Reports: No Symptoms


Neurological: Reports: No Symptoms


Psychiatric: Reports: No Symptoms





- General Info


Date of Service: 18





- Patient Data


Vital Signs - Most Recent: 


 Last Vital Signs











Temp  36.6 C   18 05:22


 


Pulse  64   18 05:22


 


Resp  16   18 05:22


 


BP  106/70   18 05:22


 


Pulse Ox  98   18 05:22











Weight - Most Recent: 93.894 kg


Lab Results - Last 24 Hours: 


 Laboratory Results - last 24 hr











  18 Range/Units





  13:16 


 


WBC  10.59 H  (3.98-10.04)  K/mm3


 


RBC  3.98  (3.98-5.22)  M/mm3


 


Hgb  8.8 L  (11.2-15.7)  gm/L


 


Hct  28.2 L  (34.1-44.9)  %


 


MCV  70.9 L  (79.4-94.8)  fl


 


MCH  22.1 L  (25.6-32.2)  pg


 


MCHC  31.2 L  (32.2-35.5)  g/dl


 


RDW Std Deviation  44.5  (36.4-46.3)  fL


 


Plt Count  324  (182-369)  K/mm3


 


MPV  9.8  (9.4-12.3)  fl


 


Neut % (Auto)  81.6 H  (34.0-71.1)  %


 


Lymph % (Auto)  10.7 L  (19.3-51.7)  %


 


Mono % (Auto)  6.8  (4.7-12.5)  %


 


Eos % (Auto)  0.4 L  (0.7-5.8)  


 


Baso % (Auto)  0.2  (0.1-1.2)  %


 


Neut # (Auto)  8.65 H  (1.56-6.13)  K/mm3


 


Lymph # (Auto)  1.13 L  (1.18-3.74)  K/mm3


 


Mono # (Auto)  0.72 H  (0.24-0.36)  K/mm3


 


Eos # (Auto)  0.04  (0.04-0.36)  K/mm3


 


Baso # (Auto)  0.02  (0.01-0.08)  K/mm3


 


Manual Slide Review  Abnormal smear  











Med Orders - Current: 


 Current Medications





Benzocaine/Menthol (Dermoplast Pain Relief Spray)  0 gm TOP ASDIRECTED PRN


   PRN Reason: Perineal Comfort Measure


   Last Admin: 18 20:49 Dose:  1 applic


Docusate Sodium (Colace)  100 mg PO BID PRN


   PRN Reason: Constipation


   Last Admin: 18 20:49 Dose:  100 mg


Emollient Ointment (Lansinoh Hpa)  0 gm TOP ASDIRECTED PRN


   PRN Reason: Sore Nipples


Ibuprofen (Motrin)  600 mg PO Q6H PRN


   PRN Reason: Mild pain or fever


   Last Admin: 18 06:07 Dose:  600 mg


Witch Hazel (Tucks)  1 pad TOP ASDIRECTED PRN


   PRN Reason: Hemorrhoid pain


   Last Admin: 18 20:50 Dose:  1 applic





Discontinued Medications





Ephedrine Sulfate (Ephedrine Sulfate)  5 mg IVPUSH ASDIRECTED PRN


   PRN Reason: Hypotension


Fentanyl (Sublimaze)  100 mcg EPIDUR Q3H PRN


   PRN Reason: Pain


   Last Admin: 18 15:19 Dose:  100 mcg


Fentanyl/Bupivacaine HCl (Fentanyl/Bupivacaine/Ns 2 Mcg-0.125% 100 Ml)  100 ml 

EPIDUR ASDIRECTED [facility]


   Last Admin: 18 15:19 Dose:  100 ml


Ampicillin Sodium 2 gm/ Sodium (Chloride)  100 mls @ 200 mls/hr IV ONETIME ONE


   Stop: 18 13:18


   Last Admin: 18 13:00 Dose:  200 mls/hr


Ampicillin Sodium 1 gm/ Sodium (Chloride)  100 mls @ 200 mls/hr IV Q4H [facility]


   Last Admin: 18 16:42 Dose:  200 mls/hr


Lactated Ringer's (Ringers, Lactated)  1,000 mls @ 100 mls/hr IV ASDIRECTED [facility]


   Last Admin: 18 15:41 Dose:  100 mls/hr


Oxytocin/Lactated Ringer's (Pitocin In Lr 10 Units/1,000 Ml)  10 unit in 1,000 

mls @ 12 mls/hr IV TITRATE LILY; 2 MUNITS/MIN


   PRN Reason: Protocol


Oxytocin/Lactated Ringer's (Pitocin In Lr 10 Units/1,000 Ml)  10 unit in 1,000 

mls @ 500 mls/hr IV .CONTINUOUS LILY


   Last Admin: 18 16:43 Dose:  500 mls/hr


Nalbuphine HCl (Nubain)  10 mg IVPUSH Q2H PRN


   PRN Reason: Pain (moderate 4-6)


Ondansetron HCl (Zofran)  4 mg IVPUSH ONETIME PRN


   PRN Reason: Nausea/Vomiting


Sodium Chloride (Saline Flush)  10 ml FLUSH ASDIRECTED PRN


   PRN Reason: Keep Vein Open











- Infant Interaction


Infant Disposition, Postpartum:  at Bedside


Support Person: 





- Postpartum Recovery Exam


Fundal Tone: Firm


Fundal Level: 1 Fingerbreadths Above Umbilicus


Fundal Placement: Midline


Lochia Amount: Small


Lochia Color: Rubra/Red





- Exam


General: Alert, Oriented


HEENT: Pupils Equal


Neck: Supple


Lungs: Clear to Auscultation, Normal Respiratory Effort


Cardiovascular: Regular Rate, Regular Rhythm


GI/Abdominal Exam: Normal Bowel Sounds, Soft, Non-Tender, No Organomegaly, No 

Distention, No Abnormal Bruit, No Mass, Pelvis Stable


Extremities: Normal Inspection, Normal Range of Motion, Non-Tender, No Pedal 

Edema, Normal Capillary Refill


Neurological: No New Focal Deficit


Psy/Mental Status: Alert, Normal Affect, Normal Mood





- Problem List Review


Problem List Initiated/Reviewed/Updated: Yes





- My Orders


Last 24 Hours: 


My Active Orders





18 12:49


Resuscitation Status Routine 





18 19:31


Activity as Tolerated [RC] PER UNIT ROUTINE 


Vital Signs [RC] 04,12,20 


Benzocaine/Menthol [Dermoplast Pain Relief Spray]   See Dose Instructions  TOP 

ASDIRECTED PRN 


Docusate Sodium [Colace]   100 mg PO BID PRN 


Ibuprofen [Motrin]   600 mg PO Q6H PRN 


Lanolin [Lansinoh HPA]   See Dose Instructions  TOP ASDIRECTED PRN 


Witch Hazel [Tucks]   1 pad TOP ASDIRECTED PRN 


Assess Lochia [WOMSER] Per Unit Routine 


Assess Uterine Involution [WOMSER] Per Unit Routine 


Breast Pump [WOMSER] Per Unit Routine 


Heat Therapy [OM.PC] PRN 


Medication Administration Instruction [OM.PC] Routine 


Perineal Care [OM.PC] Per Unit Routine 


Sitz Bath [OM.PC] Per Unit Routine 





18 19:31


Heat Therapy [OM.PC] PRN 














- Assessment


Assessment:: 





PPD1 s/p .


Doing well


Probable discharge tomorrow if not this evening.


This evening if pediatrician agrees.





- Plan


Plan:: 





Doing well 


PPD1


No issues.

## 2018-03-01 NOTE — [id number]
Post Anesthesia Note





- EVALUATION WITHIN 48HRS OF ANESTHETIC


Vital Signs in Normal Range: Yes


Patient Participated in Evaluation: Yes


Respiratory Function Stable: Yes


Airway Patent: Yes


Cardiovascular Function Stable: Yes


Hydration Status Stable: Yes


Pain Control Satisfactory: Yes


Nausea and Vomiting Control Satisfactory: Yes


Mental Status Recovered: Yes


Pulse Rate: 75


SaO2: 98


Resp Rate: 20


Temperature: 36.3 C


Blood Pressure: 94/59

## 2018-03-02 VITALS — SYSTOLIC BLOOD PRESSURE: 116 MMHG | DIASTOLIC BLOOD PRESSURE: 84 MMHG

## 2018-03-02 PROCEDURE — 3E0234Z INTRODUCTION OF SERUM, TOXOID AND VACCINE INTO MUSCLE, PERCUTANEOUS APPROACH: ICD-10-PCS | Performed by: OBSTETRICS & GYNECOLOGY

## 2018-03-02 RX ADMIN — HYDROCODONE BITARTRATE AND ACETAMINOPHEN PRN TAB: 5; 325 TABLET ORAL at 03:02

## 2018-03-02 NOTE — PCM.[email or web address]
**Discharge Summary





- Discharge Data


Discharge Date: 03/02/18


Discharge Disposition: Home, Self-Care 01


Condition: Good





- Patient Instructions


Diet: Usual Diet as Tolerated


Activity: No Strenuous Activities


Driving: May Drive Today


Showering/Bathing: May Shower


Notify Provider of: Fever, Increased Pain, Swelling and Redness, Drainage, 

Nausea and/or Vomiting





- Discharge Plan


Home Medications: 


 Home Meds





Acetaminophen/HYDROcodone [Norco 325-5 MG] 1 tab PO Q8H 07/14/17 [History]


Cyclobenzaprine [Flexeril] 5 mg PO TID 07/14/17 [History]


Doxylamine/Pyridoxine HCl [Diclegis Dr 10-10 mg Tablet] 1 each PO ASDIRECTED #

30 tablet. 07/14/17 [Rx]


Ferrous Sulfate [Iron] 325 mg PO DAILY 07/14/17 [History]


Gabapentin [Neurontin] 600 mg PO TID 07/14/17 [History]











- Discharge Summary/Plan Comment


DC Time >30 min.: No





- General Info


Date of Service: 03/02/18


Functional Status: Reports: Pain Controlled





- Review of Systems


General: Reports: No Symptoms


HEENT: Reports: No Symptoms


Pulmonary: Reports: No Symptoms


Cardiovascular: Reports: No Symptoms


Gastrointestinal: Reports: No Symptoms


Genitourinary: Reports: No Symptoms


Musculoskeletal: Reports: No Symptoms


Skin: Reports: No Symptoms


Neurological: Reports: No Symptoms


Psychiatric: Reports: No Symptoms





- Patient Data


Vitals - Most Recent: 


 Last Vital Signs











Temp  36.4 C   03/02/18 03:23


 


Pulse  75   03/02/18 03:23


 


Resp  15   03/02/18 03:23


 


BP  116/84   03/02/18 03:23


 


Pulse Ox  99   03/02/18 03:23











Weight - Most Recent: 93.894 kg


Med Orders - Current: 


 Current Medications





Hydrocodone Bitart/Acetaminophen (Norco 325-5 Mg)  1 tab PO Q6H PRN


   PRN Reason: Pain


   Last Admin: 03/02/18 03:02 Dose:  1 tab


Benzocaine/Menthol (Dermoplast Pain Relief Spray)  0 gm TOP ASDIRECTED PRN


   PRN Reason: Perineal Comfort Measure


   Last Admin: 02/28/18 20:49 Dose:  1 applic


Docusate Sodium (Colace)  100 mg PO BID PRN


   PRN Reason: Constipation


   Last Admin: 03/02/18 03:02 Dose:  100 mg


Emollient Ointment (Lansinoh Hpa)  0 gm TOP ASDIRECTED PRN


   PRN Reason: Sore Nipples


Ibuprofen (Motrin)  600 mg PO Q6H PRN


   PRN Reason: Mild pain or fever


   Last Admin: 03/02/18 01:13 Dose:  600 mg


Witch Hazel (Tucks)  1 pad TOP ASDIRECTED PRN


   PRN Reason: Hemorrhoid pain


   Last Admin: 02/28/18 20:50 Dose:  1 applic





Discontinued Medications





Bupivacaine HCl (Sensorcaine-Mpf 0.25%)  10 ml .ROUTE .STK-MED ONE


   Stop: 02/28/18 22:23


Ephedrine Sulfate (Ephedrine Sulfate)  5 mg IVPUSH ASDIRECTED PRN


   PRN Reason: Hypotension


Fentanyl (Sublimaze)  100 mcg EPIDUR Q3H PRN


   PRN Reason: Pain


   Last Admin: 02/28/18 15:19 Dose:  100 mcg


Fentanyl/Bupivacaine HCl (Fentanyl/Bupivacaine/Ns 2 Mcg-0.125% 100 Ml)  100 ml 

EPIDUR ASDIRECTED LILY


   Last Admin: 02/28/18 15:19 Dose:  100 ml


Ampicillin Sodium 2 gm/ Sodium (Chloride)  100 mls @ 200 mls/hr IV ONETIME ONE


   Stop: 02/28/18 13:18


   Last Admin: 02/28/18 13:00 Dose:  200 mls/hr


Ampicillin Sodium 1 gm/ Sodium (Chloride)  100 mls @ 200 mls/hr IV Q4H LILY


   Last Admin: 02/28/18 16:42 Dose:  200 mls/hr


Lactated Ringer's (Ringers, Lactated)  1,000 mls @ 100 mls/hr IV ASDIRECTED LILY


   Last Admin: 02/28/18 15:41 Dose:  100 mls/hr


Oxytocin/Lactated Ringer's (Pitocin In Lr 10 Units/1,000 Ml)  10 unit in 1,000 

mls @ 12 mls/hr IV TITRATE LILY; 2 MUNITS/MIN


   PRN Reason: Protocol


Oxytocin/Lactated Ringer's (Pitocin In Lr 10 Units/1,000 Ml)  10 unit in 1,000 

mls @ 500 mls/hr IV .CONTINUOUS LILY


   Last Admin: 02/28/18 16:43 Dose:  500 mls/hr


Measles/Mumps/Rubella Vaccine Live (M-M-R Ii Vaccine)  0.5 ml SUBCUT .ONCE ONE


   Stop: 03/01/18 20:04


   Last Admin: 03/01/18 20:16 Dose:  0.5 ml


Nalbuphine HCl (Nubain)  10 mg IVPUSH Q2H PRN


   PRN Reason: Pain (moderate 4-6)


Ondansetron HCl (Zofran)  4 mg IVPUSH ONETIME PRN


   PRN Reason: Nausea/Vomiting


Sodium Chloride (Saline Flush)  10 ml FLUSH ASDIRECTED PRN


   PRN Reason: Keep Vein Open











- Exam


General: Reports: Alert, Oriented


HEENT: Reports: Pupils Equal, Pupils Reactive, EOMI, Mucous Membr. Moist/Pink


Neck: Reports: Supple


Lungs: Reports: Clear to Auscultation, Normal Respiratory Effort


Cardiovascular: Reports: Regular Rate, Regular Rhythm


GI/Abdominal Exam: Normal Bowel Sounds, Soft, Non-Tender, No Organomegaly, No 

Distention, No Abnormal Bruit, No Mass, Pelvis Stable


Rectal (Female) Exam: Normal Exam, Normal Rectal Tone


Back Exam: Reports: Normal Inspection, Full Range of Motion


Extremities: Normal Inspection, Normal Range of Motion, Non-Tender, No Pedal 

Edema, Normal Capillary Refill


Skin: Reports: Warm, Dry, Intact


Wound/Incisions: Reports: Healing Well


Neurological: Reports: No New Focal Deficit


Psy/Mental Status: Reports: Alert, Normal Affect, Normal Mood





*Q Meaningful Use (DIS)





- VTE *Q


VTE Criteria *Q: 








- Stroke *Q


Stroke Criteria *Q: 








- AMI *Q


AMI Criteria *Q:

## 2021-11-24 NOTE — EDM.PDOC
ED HPI GENERAL MEDICAL PROBLEM





- General


Chief Complaint: Headache


Stated Complaint: HEADACHE/NAUSEA


Time Seen by Provider: 11/24/21 22:52


Source of Information: Reports: Patient


History Limitations: Reports: No Limitations





- History of Present Illness


INITIAL COMMENTS - FREE TEXT/NARRATIVE: 





Patient is a 31-year-old female with no significant past medical history 

presenting with a chief complaint of headache.  Headache started around 4:00 

this morning.  She states headache was gradual in onset and worsened throughout 

the day.  She states she was driving from [place] today and headache continue to 

worsen so much that she had to stop her car.  She reports headache on the right 

side is associated with light sensitivity.  She states she used to have 

headaches along time ago but has not had one in a while.  States his headache 

feels similarly.  Took some over-the-counter sinus medication that she bought at

a gas station without improvement.


  ** Headache


Pain Score (Numeric/FACES): 10





- Related Data


                                    Allergies











Allergy/AdvReac Type Severity Reaction Status Date / Time


 


No Known Allergies Allergy   Verified 11/24/21 22:42











Home Meds: 


                                    Home Meds





Ferrous Sulfate [Iron] 325 mg PO DAILY 07/14/17 [History]


Lisdexamfetamine Dimesylate [Vyvanse] 20 mg PO DAILY 11/24/21 [History]


Venlafaxine HCl [Venlafaxine ER] 75 mg PO DAILY 11/24/21 [History]


Venlafaxine [Effexor XR] 37.5 mg PO DAILY 11/24/21 [History]











Past Medical History


HEENT History: Reports: Other (See Below)


Other HEENT History: wears glasses


OB/GYN History: Reports: Pregnancy


Musculoskeletal History: Reports: Back Pain, Chronic, RA


Neurological History: Reports: Migraines


Hematologic History: Reports: Iron Deficiency





- Past Surgical History


Musculoskeletal Surgical History: Reports: None





Social & Family History





- Family History


Family Medical History: No Pertinent Family History





- Caffeine Use


Caffeine Use: Reports: Coffee, Tea





ED ROS GENERAL





- Review of Systems


Review Of Systems: See Below


Free Text/Narrative/Comment: 





In addition to that documented in the HPI above, the additional ROS was 

obtained:


Constitutional: Denies fevers or chills


Eyes: Denies vision changes


ENMT: Denies sore throat


CV: Denies chest pain


Resp: Denies SOB


GI: Denies vomiting or diarrhea


: Denies painful urination


MSK: Denies recent trauma


Skin: Denies new rashes


Neuro: Denies new numbness or tingling or weakness


Endocrine: Denies unexpected weight loss


Heme: Denies bleeding disorders








- Physical Exam


Exam: See Below


Text/Narrative:: 





I have reviewed the triage vital signs


Const: Well nourished, well developed, appears stated age


Eyes: Pupils Equal and reactive to light bilaterally, no conjunctival injection


HENT: No signs of trauma or swelling, Neck supple without meningismus 


CV: Regular Rate Rhythm, Warm, well-perfused extremities


RESP: Unlabored respiratory effort


GI: soft, non-tender, non-distended, no masses


MSK: No gross deformities appreciated


Skin: Warm, dry. No rashes


Neuro: Alert, CNs II-XII grossly intact.  No pronator drift.  Sensation and 

motor function of extremities grossly intact.


Psych: Tearful but appropriate.








Course





- Vital Signs


Last Recorded V/S: 


                                Last Vital Signs











Temp  37.4 C   11/24/21 22:37


 


Pulse  101 H  11/24/21 22:37


 


Resp  20   11/24/21 22:37


 


BP  133/90   11/24/21 22:37


 


Pulse Ox  97   11/24/21 22:37














- Orders/Labs/Meds


Meds: 


Medications














Discontinued Medications














Generic Name Dose Route Start Last Admin





  Trade Name Estuardoq  PRN Reason Stop Dose Admin


 


Acetaminophen  650 mg  11/24/21 22:49  11/24/21 23:19





  Acetaminophen 325 Mg Tab  PO  11/24/21 22:50  650 mg





  NOW ONE   Administration


 


Lactated Ringer's  1,000 mls @ 1,000 mls/hr  11/24/21 22:49  11/24/21 23:18





  Ringers, Lactated  IV  11/24/21 23:48  1,000 mls/hr





  .BOLUS ONE   Administration


 


Ketorolac Tromethamine  15 mg  11/24/21 22:49  11/24/21 23:19





  Ketorolac 15 Mg/Ml Sdv  IVPUSH  11/24/21 22:50  15 mg





  ONETIME ONE   Administration


 


Metoclopramide HCl  10 mg  11/24/21 22:49  11/24/21 23:19





  Metoclopramide 10 Mg/2 Ml Sdv  IVPUSH  11/24/21 22:50  10 mg





  ONETIME ONE   Administration


 


Prednisone  40 mg  11/25/21 23:56 





  Prednisone 20 Mg Tab  PO  11/25/21 23:57 





  ONETIME ONE  


 


Prednisone  40 mg  11/25/21 00:08  11/25/21 00:17





  Prednisone 20 Mg Tab  PO  11/25/21 00:09  40 mg





  ONETIME ONE   Administration














Departure





- Departure


Time of Disposition: 07:01


Disposition: Home, Self-Care 01


Clinical Impression: 


 Migraine








- Discharge Information


Instructions:  General Headache Without Cause, Easy-to-Read


Referrals: 


Gina Tai NP [Primary Care Provider] - 


Forms:  ED Department Discharge





Sepsis Event Note (ED)





- Evaluation


Sepsis Screening Result: No Definite Risk





- Focused Exam


Vital Signs: 


                                   Vital Signs











  Temp Pulse Resp BP Pulse Ox


 


 11/24/21 22:37  37.4 C  101 H  20  133/90  97














- Assessment/Plan


Assessment:: 





Patient 31-year-old female presenting with headache.  No evidence of 

subarachnoid hemorrhage, meningitis/thrombosis.  Patient headache improved with 

treatment.  Discharged with